# Patient Record
Sex: MALE | Race: WHITE | NOT HISPANIC OR LATINO | Employment: FULL TIME | RURAL
[De-identification: names, ages, dates, MRNs, and addresses within clinical notes are randomized per-mention and may not be internally consistent; named-entity substitution may affect disease eponyms.]

---

## 2024-10-29 ENCOUNTER — OFFICE VISIT (OUTPATIENT)
Dept: FAMILY MEDICINE CLINIC | Facility: CLINIC | Age: 34
End: 2024-10-29
Payer: COMMERCIAL

## 2024-10-29 VITALS
WEIGHT: 198 LBS | BODY MASS INDEX: 31.82 KG/M2 | TEMPERATURE: 99 F | DIASTOLIC BLOOD PRESSURE: 78 MMHG | HEIGHT: 66 IN | SYSTOLIC BLOOD PRESSURE: 118 MMHG

## 2024-10-29 DIAGNOSIS — F41.9 ANXIETY AND DEPRESSION: Primary | ICD-10-CM

## 2024-10-29 DIAGNOSIS — F32.A ANXIETY AND DEPRESSION: Primary | ICD-10-CM

## 2024-10-29 DIAGNOSIS — F17.290 VAPING NICOTINE DEPENDENCE, TOBACCO PRODUCT: ICD-10-CM

## 2024-10-29 DIAGNOSIS — M54.2 CERVICALGIA: ICD-10-CM

## 2024-10-29 DIAGNOSIS — A08.4 VIRAL ENTERITIS: ICD-10-CM

## 2024-10-29 DIAGNOSIS — E66.811 CLASS 1 OBESITY DUE TO EXCESS CALORIES WITHOUT SERIOUS COMORBIDITY WITH BODY MASS INDEX (BMI) OF 31.0 TO 31.9 IN ADULT: ICD-10-CM

## 2024-10-29 DIAGNOSIS — E66.09 CLASS 1 OBESITY DUE TO EXCESS CALORIES WITHOUT SERIOUS COMORBIDITY WITH BODY MASS INDEX (BMI) OF 31.0 TO 31.9 IN ADULT: ICD-10-CM

## 2024-10-29 PROBLEM — F41.1 GENERALIZED ANXIETY DISORDER: Status: ACTIVE | Noted: 2024-10-29

## 2024-10-29 PROCEDURE — 99204 OFFICE O/P NEW MOD 45 MIN: CPT | Performed by: INTERNAL MEDICINE

## 2024-10-29 RX ORDER — CYCLOBENZAPRINE HCL 5 MG
5 TABLET ORAL 3 TIMES DAILY PRN
Qty: 15 TABLET | Refills: 0 | Status: SHIPPED | OUTPATIENT
Start: 2024-10-29

## 2024-10-29 RX ORDER — PROMETHAZINE HYDROCHLORIDE 25 MG/1
25 TABLET ORAL EVERY 6 HOURS PRN
COMMUNITY
Start: 2024-10-28

## 2024-10-29 RX ORDER — PREDNISONE 10 MG/1
30 TABLET ORAL DAILY
Qty: 15 TABLET | Refills: 0 | Status: SHIPPED | OUTPATIENT
Start: 2024-10-29 | End: 2024-11-03

## 2024-10-29 NOTE — ASSESSMENT & PLAN NOTE
Patient with longstanding difficulties with his mood, last seen by Dr. Mukul Melissa 6/1/2021 where he discussed pattern of longstanding ADHD with some comorbid anxiety, depressive symptoms and possible mild bipolar disorder.  He had last been on regimen of Zoloft, Abilify at nighttime, and does feel it more his anxiety greater than depressive symptoms are flared up recently be interested in trying a medicine but is somewhat cautious not to do too much.  In context of comorbid bipolar disorder, I feel Vraylar would be a potentially good option and he is agreeable to initiating Vraylar 1.5 mg daily which could be titrated up at follow-up.  Continue lifestyle modifications to benefit mood.  no SI/HI.  Discussed with patient he could be potentially good candidate for GeneSight testing in the future if he had tolerability issues of medicine.  Advise concerns.

## 2024-10-29 NOTE — PROGRESS NOTES
"    Office Note     Name: Dakota Washington    : 1990     MRN: [id number]     Chief Complaint  er follow up    Subjective     History of Present Illness:  Dakota Washington is a 34 y.o. male who presents today for acute visit follow-up regarding multimedical problems.  Regular patient of Dr. Mukul Melissa but not seen in over 3 years, last on 2021.  Comes in having been seen at [facility] ER 10/27/2024 with main concern of some nausea and also left trapezius pain.  Regarding the nausea onset the day or so prior to that which has been a few days ago with some nauseousness, never vomiting but sometimes feeling close, but some looser bowel movements as well.  Never fever or chills.  That is doing a little bit better the last day but nausea still persisting but benefited by his Zofran and Phenergan.  We did discuss the potential that there could be some exacerbations of nauseousness with his anxiety pattern which is chronic but he overall feels that he does not associate the anxiety 2 episodes of nausea since.  Otherwise he also has had over the last week and a half or so some upper neck, more left trapezius level pain, possibly thought to be related to when he was hiking with his son and holding on his shoulders for a long walk, although not clearly triggering the next day.  We also discussed exacerbation potentially related to increased stressors over the last few weeks or month, although he does not specifically remember \"holding his chest and his neck region\".  No shooting pain down the arms or legs.  No strength issues, somewhat waxing waning but doing a little bit better the last couple days.  He when he was seen in the ER 10/27/2020 for he did a negative EKG, and CBC, CMP and troponin were all negative his he was also describing a little bit of left upper chest pain at that time which seems to be have been very muscular, that is actually doing better.  Related to mood, longstanding difficulties with " "mood including some anxiety, depression, possible bipolar disorder and ADHD historically.  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  No SI/HI.    Review of Systems    Objective     History reviewed. No pertinent past medical history.  History reviewed. No pertinent surgical history.  History reviewed. No pertinent family history.    Vital Signs  /78   Temp 99 °F (37.2 °C) (Temporal)   Ht 167.6 cm (66\")   Wt 89.8 kg (198 lb)   BMI 31.96 kg/m²   Estimated body mass index is 31.96 kg/m² as calculated from the following:    Height as of this encounter: 167.6 cm (66\").    Weight as of this encounter: 89.8 kg (198 lb).    Physical Exam  Constitutional:       General: He is not in acute distress.     Appearance: Normal appearance. He is not ill-appearing, toxic-appearing or diaphoretic.   HENT:      Right Ear: Tympanic membrane, ear canal and external ear normal.      Left Ear: Tympanic membrane, ear canal and external ear normal.      Nose: Nose normal. No rhinorrhea.      Mouth/Throat:      Mouth: Mucous membranes are moist.      Pharynx: Oropharynx is clear. No oropharyngeal exudate or posterior oropharyngeal erythema.   Neck:      Vascular: No carotid bruit.   Cardiovascular:      Rate and Rhythm: Normal rate and regular rhythm.      Pulses: Normal pulses.      Heart sounds: Normal heart sounds. No murmur heard.     No friction rub. No gallop.   Pulmonary:      Effort: Pulmonary effort is normal. No respiratory distress.      Breath sounds: Normal breath sounds. No stridor. No wheezing.   Abdominal:      General: Abdomen is flat. Bowel sounds are normal. There is no distension.    "   Palpations: Abdomen is soft. There is no mass.      Tenderness: There is abdominal tenderness. There is no guarding or rebound.      Comments: Tenderness in the upper abdomen, with negative rebound and guarding.   Musculoskeletal:         General: Tenderness present.      Cervical back: Neck supple.      Right lower leg: No edema.      Left lower leg: No edema.      Comments: Mild tenderness to palpate in the muscles of the upper neck is inserted to the posterior occiput, and the trapezius on the left greater than right, flexion of these muscles reproduces mild discomfort.  No swelling, no erythema.  No unusual lumps or bumps.   Lymphadenopathy:      Cervical: No cervical adenopathy.   Skin:     General: Skin is warm and dry.   Neurological:      General: No focal deficit present.      Mental Status: He is alert and oriented to person, place, and time. Mental status is at baseline.   Psychiatric:         Mood and Affect: Mood normal.         Behavior: Behavior normal.         Thought Content: Thought content normal.                   POCT Results (if applicable):  No results found for this or any previous visit.         Assessment and Plan     Diagnoses and all orders for this visit:    1. Anxiety and depression (Primary)  Assessment & Plan:  Patient with longstanding difficulties with his mood, last seen by Dr. Mukul Melissa 6/1/2021 where he discussed pattern of longstanding ADHD with some comorbid anxiety, depressive symptoms and possible mild bipolar disorder.  He had last been on regimen of Zoloft, Abilify at nighttime, and does feel it more his anxiety greater than depressive symptoms are flared up recently be interested in trying a medicine but is somewhat cautious not to do too much.  In context of comorbid bipolar disorder, I feel Vraylar would be a potentially good option and he is agreeable to initiating Vraylar 1.5 mg daily which could be titrated up at follow-up.  Continue lifestyle modifications to  benefit mood.  no SI/HI.  Discussed with patient he could be potentially good candidate for GeneSight testing in the future if he had tolerability issues of medicine.  Advise concerns.    Orders:  -     Cariprazine HCl (Vraylar) 1.5 MG capsule capsule; Take 1 capsule by mouth Daily.  Dispense: 30 capsule; Refill: 1    2. Cervicalgia  Assessment & Plan:  Flare over the last couple weeks, felt possibly related to an episode where he went hiking, maybe corrugation just partly related to increased stressors and feeling them in the neck and upper torso region.  No concerning neurologic manifestations.  Initiate prednisone 10 mg tablet 3 tablets daily x 5 days followed by naproxen 375 mg twice daily for another week or 10 days.  Heating pad, light stretching.  Addition provided Flexeril 5 mg 3 times daily as needed, #15, use mostly at nighttime, caution sedation.  Advised if not improving.    Orders:  -     predniSONE (DELTASONE) 10 MG tablet; Take 3 tablets by mouth Daily for 5 days.  Dispense: 15 tablet; Refill: 0  -     naproxen (NAPROSYN) 375 MG tablet; Take 1 tablet by mouth 2 (Two) Times a Day As Needed for Mild Pain for up to 30 days.  Dispense: 60 tablet; Refill: 0  -     cyclobenzaprine (FLEXERIL) 5 MG tablet; Take 1 tablet by mouth 3 (Three) Times a Day As Needed for Muscle Spasms.  Dispense: 15 tablet; Refill: 0    3. Viral enteritis  Assessment & Plan:  Pattern of some ongoing nausea over the last 3 to 4 days, felt to be most consistent with a milder stomach bug with some little bit of looser bowel movements, overall good hydration.  He already has Zofran and Phenergan to use as needed, although recommend Omnicef same time.  Push fluids, with gradual transition back diet as tolerated, although he does what sounds like periodic fasting episodes for attempts at weight loss.  Advise if not improving.      4. Vaping nicotine dependence, tobacco product  Assessment & Plan:  Previous smoker at about 1 pack/day on  and off for about a total of 10 years, with cessation of September 2020 but initiation of vaping since.  Recommend benefits of vaping cessation as it has its own risks, he will consider but is not ready to pursue at this time.      5. Class 1 obesity due to excess calories without serious comorbidity with body mass index (BMI) of 31.0 to 31.9 in adult  Assessment & Plan:  Somewhat longstanding challenges with his weight, he is making efforts to eat healthier, although is also implementing a fasting schedule which he feels has been beneficial to him, doing worse over the last few weeks.  As long as he ensures he has adequate caloric intake, this can be a reasonable approach.        BMI is >= 30 and <35. (Class 1 Obesity). The following options were offered after discussion;: weight loss educational material (shared in after visit summary), exercise counseling/recommendations, and nutrition counseling/recommendations        Vaccine Counseling:    Smoking Cessation:   3-10 mintues spent counseling Will try to cut down    Follow Up  Return in about 6 weeks (around 12/10/2024) for Annual physical.    Edson Melissa MD

## 2024-10-29 NOTE — ASSESSMENT & PLAN NOTE
Pattern of some ongoing nausea over the last 3 to 4 days, felt to be most consistent with a milder stomach bug with some little bit of looser bowel movements, overall good hydration.  He already has Zofran and Phenergan to use as needed, although recommend Omnicef same time.  Push fluids, with gradual transition back diet as tolerated, although he does what sounds like periodic fasting episodes for attempts at weight loss.  Advise if not improving.

## 2024-10-29 NOTE — ASSESSMENT & PLAN NOTE
Previous smoker at about 1 pack/day on and off for about a total of 10 years, with cessation of September 2020 but initiation of vaping since.  Recommend benefits of vaping cessation as it has its own risks, he will consider but is not ready to pursue at this time.

## 2024-10-29 NOTE — ASSESSMENT & PLAN NOTE
Flare over the last couple weeks, felt possibly related to an episode where he went hiking, maybe corrugation just partly related to increased stressors and feeling them in the neck and upper torso region.  No concerning neurologic manifestations.  Initiate prednisone 10 mg tablet 3 tablets daily x 5 days followed by naproxen 375 mg twice daily for another week or 10 days.  Heating pad, light stretching.  Addition provided Flexeril 5 mg 3 times daily as needed, #15, use mostly at nighttime, caution sedation.  Advised if not improving.

## 2024-10-29 NOTE — ASSESSMENT & PLAN NOTE
Somewhat longstanding challenges with his weight, he is making efforts to eat healthier, although is also implementing a fasting schedule which he feels has been beneficial to him, doing worse over the last few weeks.  As long as he ensures he has adequate caloric intake, this can be a reasonable approach.

## 2024-11-25 DIAGNOSIS — M54.2 CERVICALGIA: ICD-10-CM

## 2024-11-25 NOTE — TELEPHONE ENCOUNTER
Denied naproxen it was given for 30 days if patient requires more will need to be seen by primary

## 2025-02-14 ENCOUNTER — OFFICE VISIT (OUTPATIENT)
Dept: FAMILY MEDICINE CLINIC | Facility: CLINIC | Age: 35
End: 2025-02-14
Payer: COMMERCIAL

## 2025-02-14 VITALS
DIASTOLIC BLOOD PRESSURE: 78 MMHG | BODY MASS INDEX: 28.9 KG/M2 | HEART RATE: 84 BPM | OXYGEN SATURATION: 98 % | SYSTOLIC BLOOD PRESSURE: 122 MMHG | WEIGHT: 179.8 LBS | TEMPERATURE: 98.2 F | HEIGHT: 66 IN

## 2025-02-14 DIAGNOSIS — H65.03 BILATERAL ACUTE SEROUS OTITIS MEDIA, RECURRENCE NOT SPECIFIED: ICD-10-CM

## 2025-02-14 DIAGNOSIS — Z87.891 HISTORY OF NICOTINE VAPING: ICD-10-CM

## 2025-02-14 DIAGNOSIS — F41.9 ANXIETY: ICD-10-CM

## 2025-02-14 DIAGNOSIS — F32.A DEPRESSION, UNSPECIFIED DEPRESSION TYPE: ICD-10-CM

## 2025-02-14 DIAGNOSIS — Z87.891 EX-CIGARETTE SMOKER: ICD-10-CM

## 2025-02-14 DIAGNOSIS — H69.93 DYSFUNCTION OF BOTH EUSTACHIAN TUBES: ICD-10-CM

## 2025-02-14 DIAGNOSIS — J06.9 UPPER RESPIRATORY TRACT INFECTION, UNSPECIFIED TYPE: Primary | ICD-10-CM

## 2025-02-14 PROCEDURE — 99214 OFFICE O/P EST MOD 30 MIN: CPT | Performed by: INTERNAL MEDICINE

## 2025-02-14 RX ORDER — FLUTICASONE PROPIONATE 50 MCG
2 SPRAY, SUSPENSION (ML) NASAL DAILY
Qty: 16 G | Refills: 1 | Status: SHIPPED | OUTPATIENT
Start: 2025-02-14

## 2025-02-14 RX ORDER — PREDNISONE 20 MG/1
20 TABLET ORAL DAILY
Qty: 5 TABLET | Refills: 0 | Status: SHIPPED | OUTPATIENT
Start: 2025-02-14 | End: 2025-02-19

## 2025-02-14 NOTE — ASSESSMENT & PLAN NOTE
Bilateral serous effusions noted undoubtedly secondary to eustachian tube dysfunction caused by URI.  Treating with prednisone along with Flonase to help facilitate resolution of the obstruction thus plugging sensation in his ears.

## 2025-02-14 NOTE — ASSESSMENT & PLAN NOTE
Secondary to bilateral eustachian tube dysfunction from underlying URI.  Treat with prednisone along with Flonase nasal spray as needed.  Advise if symptoms not resolving.

## 2025-02-14 NOTE — PROGRESS NOTES
Follow Up Office Visit      Date: 2025   Patient Name: Dakota Washington  : 1990   MRN: [id number]     Chief Complaint:    Chief Complaint   Patient presents with    Earache       History of Present Illness: Dakota Washington is a 34 y.o. male who is here today for 2 days ago also minimal nasal congestion postnasal drip and a plugging sensation in his ears, more so on the left, noting symptoms maybe a little bit better.  No true pain, hearing overall seems to be unremarkable..  Not ill, no fevers chills or sweats.  He also has a history of longstanding anxiety and depression with questionable bipolar disorder, in the past having been treated with Zoloft and Abilify.  Saw Dr. Edson Melissa in 10/2024 where he is prescribed a trial of Vraylar which he indicates he did not actually take.  He has tried to make some lifestyle modifications including exercise and dieting as a means of generally improving his overall health.  He has lost some weight on this regimen though he does note some potential mood concerns, for which we will address that I recommended upcoming complete physical.  Ex-cigarette smoker abstaining for 10 years and most recently stopped vaping a couple days ago after vaping for 3 to 4 years    Subjective      Review of Systems:   Review of Systems    I have reviewed the patients family history, social history, past medical history, past surgical history and have updated it as appropriate.     Medications:     Current Outpatient Medications:     fluticasone (FLONASE) 50 MCG/ACT nasal spray, Administer 2 sprays into the nostril(s) as directed by provider Daily., Disp: 16 g, Rfl: 1    predniSONE (DELTASONE) 20 MG tablet, Take 1 tablet by mouth Daily for 5 days., Disp: 5 tablet, Rfl: 0    Allergies:   Allergies   Allergen Reactions    Penicillins Unknown - Low Severity       Objective     Physical Exam: Please see above  Vital Signs:   Vitals:    25 1302   BP: 122/78   BP Location: Left arm   Patient  "Position: Sitting   Cuff Size: Adult   Pulse: 84   Temp: 98.2 °F (36.8 °C)   TempSrc: Temporal   SpO2: 98%   Weight: 81.6 kg (179 lb 12.8 oz)   Height: 167.6 cm (66\")     Body mass index is 29.02 kg/m².          Physical Exam  Constitutional:       General: He is not in acute distress.     Appearance: Normal appearance. He is not ill-appearing.      Comments: Pleasant healthy well-spoken well-groomed 34-year-old male, NAD, BMI 29.0 reflecting a 19 pound weight loss by intent in the last 3 and half months.   HENT:      Right Ear: Ear canal normal.      Left Ear: Ear canal normal.      Ears:      Comments: TMs bilaterally with clear effusions, slight dullness     Nose: Congestion present. No rhinorrhea.      Mouth/Throat:      Mouth: Mucous membranes are moist.      Pharynx: Oropharyngeal exudate and posterior oropharyngeal erythema present.      Comments: Minimal nonspecific posterior erythema  Cardiovascular:      Rate and Rhythm: Normal rate and regular rhythm.      Heart sounds: Normal heart sounds. No murmur heard.     No friction rub. No gallop.   Pulmonary:      Effort: Pulmonary effort is normal. No respiratory distress.      Breath sounds: Normal breath sounds.   Musculoskeletal:      Cervical back: No rigidity or tenderness.   Lymphadenopathy:      Cervical: No cervical adenopathy.   Neurological:      General: No focal deficit present.      Mental Status: He is alert.   Psychiatric:         Mood and Affect: Mood normal.         Procedures    Results:   Labs:   No results found for: \"HGBA1C\", \"CMP\", \"CBCDIFFPANEL\", \"CREAT\", \"TSH\"     POCT Results (if applicable):   No results found for this or any previous visit.      Assessment / Plan      Assessment/Plan:   Diagnoses and all orders for this visit:    1. Upper respiratory tract infection, unspecified type (Primary)  Assessment & Plan:  Mild URI symptoms for which we will treat with Flonase along with OTC cough and cold medications.  This is likely causing " secondary eustachian tube dysfunction with resultant bilateral serous effusions.      2. Dysfunction of both eustachian tubes  Assessment & Plan:  Bilateral serous effusions noted undoubtedly secondary to eustachian tube dysfunction caused by URI.  Treating with prednisone along with Flonase to help facilitate resolution of the obstruction thus plugging sensation in his ears.    Orders:  -     predniSONE (DELTASONE) 20 MG tablet; Take 1 tablet by mouth Daily for 5 days.  Dispense: 5 tablet; Refill: 0  -     fluticasone (FLONASE) 50 MCG/ACT nasal spray; Administer 2 sprays into the nostril(s) as directed by provider Daily.  Dispense: 16 g; Refill: 1    3. Bilateral acute serous otitis media, recurrence not specified  Assessment & Plan:  Secondary to bilateral eustachian tube dysfunction from underlying URI.  Treat with prednisone along with Flonase nasal spray as needed.  Advise if symptoms not resolving.    Orders:  -     predniSONE (DELTASONE) 20 MG tablet; Take 1 tablet by mouth Daily for 5 days.  Dispense: 5 tablet; Refill: 0  -     fluticasone (FLONASE) 50 MCG/ACT nasal spray; Administer 2 sprays into the nostril(s) as directed by provider Daily.  Dispense: 16 g; Refill: 1    4. Anxiety  Assessment & Plan:  Patient has had a history of longstanding depression with concomitant anxiety, previously having been treated with most recently with Abilify and Zoloft.  He had seen Dr. Edson Melissa in 10/2024 where he was prescribed a trial of Vraylar as monotherapy.  He indicates to me he really did not even take the medication, and has tried to make some lifestyle modifications as this treatment modality.  Will contemplate in the future potentially trying some other medication.  Will discuss in the future at recommended complete physical.      5. Depression, unspecified depression type  Assessment & Plan:  Patient has had a history of longstanding depression with concomitant anxiety, previously having been treated with most  recently with Abilify and Zoloft.  He had seen Dr. Edson Melissa in 10/2024 where he was prescribed a trial of Vraylar as monotherapy.  He indicates to me he really did not even take the medication, and has tried to make some lifestyle modifications as this treatment modality.  Will contemplate in the future potentially trying some other medication.  Will discuss in the future at recommended complete physical.      6. Ex-cigarette smoker  Assessment & Plan:  Relates abstaining from cigarette smoking for the past 10 years, at approximately age 24.      7. History of nicotine vaping  Assessment & Plan:  Relates stopping vaping nicotine product 2 days ago with intent to maintain abstinence, having utilized a vaping pen for the last 3 to 4 years.  Encouraged ongoing abstinence.  If he does have trouble with his nicotine craving, I did then suggest use of NicoDerm patches, Nicorette gum or nicotine oral pouches as needed.          Follow Up:   Return in about 2 months (around 4/14/2025) for Annual physical.      At [facility], we believe that sharing information builds trust and better relationships. You are receiving this note because you recently visited [facility]. It is possible you will see health information before a provider has talked with you about it. This kind of information can be easy to misunderstand. To help you fully understand what it means for your health, we urge you to discuss this note with your provider.    Mukul Melissa MD  [facility]

## 2025-02-14 NOTE — ASSESSMENT & PLAN NOTE
Patient has had a history of longstanding depression with concomitant anxiety, previously having been treated with most recently with Abilify and Zoloft.  He had seen Dr. Edson Melissa in 10/2024 where he was prescribed a trial of Vraylar as monotherapy.  He indicates to me he really did not even take the medication, and has tried to make some lifestyle modifications as this treatment modality.  Will contemplate in the future potentially trying some other medication.  Will discuss in the future at recommended complete physical.

## 2025-02-14 NOTE — ASSESSMENT & PLAN NOTE
Mild URI symptoms for which we will treat with Flonase along with OTC cough and cold medications.  This is likely causing secondary eustachian tube dysfunction with resultant bilateral serous effusions.

## 2025-02-14 NOTE — ASSESSMENT & PLAN NOTE
Relates stopping vaping nicotine product 2 days ago with intent to maintain abstinence, having utilized a vaping pen for the last 3 to 4 years.  Encouraged ongoing abstinence.  If he does have trouble with his nicotine craving, I did then suggest use of NicoDerm patches, Nicorette gum or nicotine oral pouches as needed.

## 2025-03-24 ENCOUNTER — TELEPHONE (OUTPATIENT)
Dept: FAMILY MEDICINE CLINIC | Facility: CLINIC | Age: 35
End: 2025-03-24
Payer: COMMERCIAL

## 2025-03-24 NOTE — TELEPHONE ENCOUNTER
I have spoke with him and have let him know that Dr. Gilman says that he can take both of the medication. He states he will see us ion at his physical. TF

## 2025-03-24 NOTE — TELEPHONE ENCOUNTER
Patient called our office asking to have his April appointment moved up. I have rescheduled his physical for tomorrow, March 25 with Dr. Gilman.     Patient did state his anxiety has been extremely bad the past few weeks. He did tell me he started taking Vraylar this morning, but wanted to know if he could take a prescribed Ativan with Vraylar until he can be seen at his appointment.     Vraylar was prescribed my us?, and Ativan from the ER.    I let patient know his appointment was moved up, but I would have the nurse contact him regarding medication.    Please contact patient at [phone]

## 2025-03-25 ENCOUNTER — OFFICE VISIT (OUTPATIENT)
Age: 35
End: 2025-03-25
Payer: COMMERCIAL

## 2025-03-25 ENCOUNTER — OFFICE VISIT (OUTPATIENT)
Dept: FAMILY MEDICINE CLINIC | Facility: CLINIC | Age: 35
End: 2025-03-25
Payer: COMMERCIAL

## 2025-03-25 VITALS
BODY MASS INDEX: 27.71 KG/M2 | DIASTOLIC BLOOD PRESSURE: 84 MMHG | HEIGHT: 66 IN | HEART RATE: 133 BPM | OXYGEN SATURATION: 97 % | WEIGHT: 172.4 LBS | TEMPERATURE: 97.9 F | SYSTOLIC BLOOD PRESSURE: 136 MMHG

## 2025-03-25 VITALS
HEART RATE: 78 BPM | DIASTOLIC BLOOD PRESSURE: 84 MMHG | WEIGHT: 172.4 LBS | OXYGEN SATURATION: 98 % | HEIGHT: 66 IN | BODY MASS INDEX: 27.71 KG/M2 | SYSTOLIC BLOOD PRESSURE: 134 MMHG

## 2025-03-25 DIAGNOSIS — Z11.59 NEED FOR HEPATITIS C SCREENING TEST: ICD-10-CM

## 2025-03-25 DIAGNOSIS — F31.62 BIPOLAR DISORDER, CURRENT EPISODE MIXED, MODERATE: ICD-10-CM

## 2025-03-25 DIAGNOSIS — F33.1 MODERATE EPISODE OF RECURRENT MAJOR DEPRESSIVE DISORDER: ICD-10-CM

## 2025-03-25 DIAGNOSIS — F41.1 GENERALIZED ANXIETY DISORDER WITH PANIC ATTACKS: ICD-10-CM

## 2025-03-25 DIAGNOSIS — Z13.1 SCREENING FOR DIABETES MELLITUS: ICD-10-CM

## 2025-03-25 DIAGNOSIS — F41.0 GENERALIZED ANXIETY DISORDER WITH PANIC ATTACKS: ICD-10-CM

## 2025-03-25 DIAGNOSIS — Z00.01 ENCOUNTER FOR GENERAL ADULT MEDICAL EXAMINATION WITH ABNORMAL FINDINGS: Primary | ICD-10-CM

## 2025-03-25 DIAGNOSIS — Z87.891 HISTORY OF NICOTINE VAPING: ICD-10-CM

## 2025-03-25 DIAGNOSIS — F31.62 BIPOLAR DISORDER, CURRENT EPISODE MIXED, MODERATE: Primary | ICD-10-CM

## 2025-03-25 DIAGNOSIS — F41.9 ANXIETY: ICD-10-CM

## 2025-03-25 DIAGNOSIS — E78.5 DYSLIPIDEMIA: ICD-10-CM

## 2025-03-25 DIAGNOSIS — E55.9 VITAMIN D DEFICIENCY: ICD-10-CM

## 2025-03-25 DIAGNOSIS — Z28.82 PARENT REFUSES IMMUNIZATIONS: ICD-10-CM

## 2025-03-25 DIAGNOSIS — Z13.29 SCREENING FOR THYROID DISORDER: ICD-10-CM

## 2025-03-25 DIAGNOSIS — E66.3 OVERWEIGHT (BMI 25.0-29.9): ICD-10-CM

## 2025-03-25 DIAGNOSIS — Z87.891 EX-CIGARETTE SMOKER: ICD-10-CM

## 2025-03-25 RX ORDER — LAMOTRIGINE 25 MG/1
TABLET ORAL
Qty: 46 TABLET | Refills: 0 | Status: SHIPPED | OUTPATIENT
Start: 2025-03-25 | End: 2025-04-24

## 2025-03-25 NOTE — ASSESSMENT & PLAN NOTE
Patient declines recommended Tdap, COVID-19 and influenza vaccines today despite counseling regarding safety and efficacy

## 2025-03-25 NOTE — PROGRESS NOTES
..  Venipuncture Blood Specimen Collection  Venipuncture performed in right arm by Loulou Palma with good hemostasis. Patient tolerated the procedure well without complications.   03/25/25   Loulou Palma

## 2025-03-25 NOTE — ASSESSMENT & PLAN NOTE
>>ASSESSMENT AND PLAN FOR ANXIETY AND DEPRESSION WRITTEN ON 3/25/2025  1:27 PM BY JOANIE SPANGLER APRN     >>ASSESSMENT AND PLAN FOR DEPRESSION WRITTEN ON 3/25/2025 12:06 PM BY GEENA THOMAS MD    Patient has had longstanding anxiety and depression with irritability and anger consistent with bipolar disorder with mixed features.  Previously had been prescribed sertraline past which she did not take, as well as briefly took Lexapro, and most recently had been prescribed Vraylar 1.5 mg nightly which she just took 1 dose yesterday.  His symptoms are becoming disabling regarding his quality of life.  Previously smoked marijuana but not for some time now.  Denies any history of illicit drug use or alcohol abuse otherwise.  No SI/HI.  Referring to psychiatry as well as for counseling, initial appointment today, deferring targeted medication regimen to that consultant.     >>ASSESSMENT AND PLAN FOR ANXIETY WRITTEN ON 3/25/2025 12:06 PM BY GEENA THOMAS MD    Patient has had longstanding anxiety and depression with irritability and anger consistent with bipolar disorder with mixed features.  Previously had been prescribed sertraline past which she did not take, as well as briefly took Lexapro, and most recently had been prescribed Vraylar 1.5 mg nightly which she just took 1 dose yesterday.  His symptoms are becoming disabling regarding his quality of life.  Previously smoked marijuana but not for some time now.  Denies any history of illicit drug use or alcohol abuse otherwise.  No SI/HI.  Referring to psychiatry as well as for counseling, initial appointment today, deferring targeted medication regimen to that consultant.

## 2025-03-25 NOTE — ASSESSMENT & PLAN NOTE
Patient had been vaping nicotine products since smoke abstinence several years ago.  He discontinued completely approximately 3 months ago

## 2025-03-25 NOTE — ASSESSMENT & PLAN NOTE
Patient has had longstanding anxiety and depression with irritability and anger consistent with bipolar disorder with mixed features.  Previously had been prescribed sertraline past which she did not take, as well as briefly took Lexapro, and most recently had been prescribed Vraylar 1.5 mg nightly which she just took 1 dose yesterday.  His symptoms are becoming disabling regarding his quality of life.  Previously smoked marijuana but not for some time now.  Denies any history of illicit drug use or alcohol abuse otherwise.  No SI/HI.  Referring to psychiatry as well as for counseling, initial appointment today, deferring targeted medication regimen to that consultant.

## 2025-03-25 NOTE — ASSESSMENT & PLAN NOTE
Patient previously took fleeting supplement vitamin D, but no longer.  Update level and make further recommendation regarding need for supplementation.

## 2025-03-25 NOTE — ASSESSMENT & PLAN NOTE
Patient encouraged to continue his recent efforts at healthy lifestyle changes with diet and regular physical activity

## 2025-03-25 NOTE — ASSESSMENT & PLAN NOTE
Laboratory testing through [facility] on 12/21/2024 revealing a mildly elevated LDL of 142 with remainder of lipid profile satisfactory.  We discussed need to continue efforts at lifestyle change with diet and exercise, planning to repeat another lipid profile in 1 year

## 2025-03-25 NOTE — ASSESSMENT & PLAN NOTE
34-year-old male presenting for complete physical with specific health issues being addressed as detailed below, health maintenance includes recent laboratory testing through the [facility] including CBC, CMP, troponin x 2, and EKG x 2 all normal.  Lipid profile mildly elevated with LDL of 142.  Plan supplement with TSH, vitamin D, hepatitis C and UA screens.  Patient being referred to psychiatry as well as for counseling to address his mental health issues.  Follow-up here in 1 year for another complete physical, and as needed in the interim.

## 2025-03-25 NOTE — PROGRESS NOTES
New Patient Office Visit      Patient Name: Dakota Washington  : 1990   MRN: [id number]     Referring Provider: Mukul Melissa MD    Chief Complaint:  Psychiatric evaluation related to:     ICD-10-CM ICD-9-CM   1. Bipolar disorder, current episode mixed, moderate  F31.62 296.62   2. Generalized anxiety disorder with panic attacks  F41.1 300.02    F41.0 300.01        History of Present Illness:   Dakota Washington is a 34 y.o. male who is here today for initial psychiatric evaluation. He presents with complaints of depression and anxiety. He says he has become so irritable recently that it is not fair to his wife and children. He has dealt with these problems for most of his life, but has been resistant to medication in the past. He had a couple trials of antidepressants, but stopped taking them after a couple weeks before they had a chance to be effective. He was prescribed Vraylar this past October and did not begin taking it until recently. He does not like this medication as he says it caused him GI side effects, as well as sedation. He says much of his anxiety recently has been caused by his stressful job,as well as health-related anxiety. He has been seen by his PCP who cleared him of any health-related illness. His primary symptoms include irritability, racing thoughts, lack of focus, and occasional panic attacks. His most recent panic attack was in December which resulted in an emergency room visit. He was prescribed Ativan 1 mg as needed during this visit and reports he has been taking it only sparingly. During the interview he presented with significantly increased rate of speech. He also shared that he has been engaging in a lot of conspiracy theories on line. I screened him for bipolar on the mood disorder questionnaire and he was positive with a score of 10. He says during the past 2 weeks he has been more depressed than usual, and is tired of feeling this way, and is ready to deal with this issue now.  "He currently is not utilizing any drugs, but in the past he has self-medicated with marijuana, tobacco, and party drugs when he was younger. He reports he is still sleeping well and has not struggled with that, other than occasionally having trouble falling asleep due to racing thoughts. He denies suicidal ideation or a history of suicidal ideation. He reports no history of trauma. He said his parents  at the age of 7, but maintained a good relationship with both of them. He describes his childhood as a \"standard childhood,\" however his mother has been hospitalized multiple times for bipolar. Of note, he was on stimulants as a child and as a young adult for ADHD. He says the stimulants made him a zombie as a child, and made him manic as an adult.      Subjective     PHQ-9 Depression Screening Score: PHQ-9 Total Score: 19  ANDREAS-7 Anxiety Screening Score: 20  Mood Disorder Questionnaire Screening Score: 10    Psychiatric Review of Systems:   Mood: Depressed  Anxiety: Moderate anxiety  Ada: Positive for periods of increased hyper behavior and confidence, irritability, increased rate of speech, racing thoughts, being easily distracted, periods of increased energy, periods of increased activity and social behavior, and engaging in risky behavior.  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    Interpersonal/Relational:  Marital Status:   Family Structure: Lives with wife who he has been with for 13 years, and their 2 sons, age 11 and 4. He also has another 11-year-old son with another woman whom he has split custody with. He says he argues a lot with his wife, but they also have good days as well. Positive relationship with both parents.  Support system:  Family      Psychiatric History:   Medication: Currently taking Ativan 1 mg as needed.  Recently prescribed Vraylar, which he has not been taking. " Previous trials of SSRI's, which he stopped taking before they had a chance to be effective.  Hospitalization: Denies  Counseling/Therapy: Will be starting therapy later this week  Seizures: Denies  Suicide Attempts: Denies  Suicidal Ideation: Denies  Self-injurious behavior: Denies    History of Substance Use/Abuse:   Alcohol: Currently only drinks socially. Drank more heavily in the past, which at one point resulted in a DUI and the loss of his job.  Drugs: Currently denies. Used marijuana heavily in the past for 10 years, but has stopped due to it causing increased anxiety. Used illegal drugs when younger at parties.  Caffeine: Currently denies. Consumed moderately before.  Tobacco: Former smoker in his 20s. Has since quit. Also recently quit vaping.     Family Psychiatric History:  Mother positive for bipolar, which has resulted in multiple hospitalizations for her.    Significant Life Events:   Has patient experienced any form of verbal, physical, emotional, or sexual abuse? no  Has patient experienced a death / loss of relationship? no  Has patient experienced a major accident or tragic events? no    Social History:   Social History     Socioeconomic History    Marital status:    Tobacco Use    Smoking status: Never    Smokeless tobacco: Never   Vaping Use    Vaping status: Former   Substance and Sexual Activity    Alcohol use: Yes     Comment: moderately    Drug use: Never        Past Medical History: History reviewed. No pertinent past medical history.    Past Surgical History: History reviewed. No pertinent surgical history.    Family History: History reviewed. No pertinent family history.    Medications:     Current Outpatient Medications:     Cariprazine HCl (Vraylar) 1.5 MG capsule capsule, Take 1 capsule by mouth Daily., Disp: , Rfl:     fluticasone (FLONASE) 50 MCG/ACT nasal spray, Administer 2 sprays into the nostril(s) as directed by provider Daily., Disp: 16 g, Rfl: 1    lamoTRIgine  "(LaMICtal) 25 MG tablet, Take 1 tablet by mouth Daily for 14 days, THEN 2 tablets Daily for 16 days., Disp: 46 tablet, Rfl: 0    Allergies:   Allergies   Allergen Reactions    Penicillins Unknown - Low Severity         Objective     Physical Exam:  Vital Signs:   Vitals:    03/25/25 1106   BP: 134/84   Pulse: 78   SpO2: 98%   Weight: 78.2 kg (172 lb 6.4 oz)   Height: 167.6 cm (66\")     Body mass index is 27.83 kg/m².     Mental Status Exam:   Hygiene:   good  Cooperation:  Cooperative  Eye Contact:  Good  Psychomotor Behavior:  Appropriate  Affect:  Appropriate  Mood: anxious  Speech:  Rapid  Thought Process:  Goal directed and Linear  Thought Content:  Normal  Suicidal:  None  Homicidal:  None  Hallucinations:  None  Delusion:  None  Memory:  Intact  Orientation:  Grossly intact  Reliability:  good  Insight:  Good  Judgement:  Good  Impulse Control:  Good  Physical/Medical Issues:  No      SUICIDE RISK ASSESSMENT/CSSRS:  1. Does patient have thoughts of suicide? no  2. Does patient have intent for suicide? no  3. Does patient have a current plan for suicide? no  4. History of suicide attempts: no  5. Family history of suicide or attempts: no  6. History of violent behaviors towards others or property or thoughts of committing suicide: no  7. History of sexual aggression toward others: no  8. Access to firearms or weapons: no    Assessment / Plan      Visit Diagnosis/Orders Placed This Visit:  Diagnoses and all orders for this visit:    1. Bipolar disorder, current episode mixed, moderate (Primary)  -     lamoTRIgine (LaMICtal) 25 MG tablet; Take 1 tablet by mouth Daily for 14 days, THEN 2 tablets Daily for 16 days.  Dispense: 46 tablet; Refill: 0    2. Generalized anxiety disorder with panic attacks           PLAN:  Safety: No acute safety concerns  Risk Assessment: Risk of self-harm acutely is low. Risk of self-harm chronically is also low, but could be further elevated in the event of treatment " noncompliance.    Treatment Plan/Goals: Given the patient's symptoms and his family history I believe he suffers from bipolar disorder. I will start him on the lamotrigine titration for mood stabilization. I explained to him to watch for the rash and to stop taking the medication if he develops the rash. I told him he can hold off on the Vraylar for now since he does not like this medication. Hopefully lamotrigine monotherapy will be enough to stabilize his mood. If not we can look into augmentation in the future. I told him I am okay with him continuing to take the Ativan 1 mg as needed sparingly, like he is doing, until he runs out. I explained to him the dangers of this medication and that we will not be continuing it after this initial trial. I explained to him that if he continues to suffer from anxiety after we stabilize his mood we can look into other anxiety medication in the future. I will follow up in 1 month to assess the medication plan.    Continue supportive psychotherapy efforts and medications as indicated. Treatment and medication options discussed during today's visit. Patient ackowledged and verbally consented to continue with current treatment plan and was educated on the importance of compliance with treatment and follow-up appointments. Patient seems reasonably able to adhere to treatment plan.      Provided a safe, confidential environment to facilitate the development of a positive therapeutic relationship and encouraged open, honest communication. Assisted Patient in identifying risk factors which would indicate the need for higher level of care including thoughts to harm self or others and/or self-harming behavior and encouraged patient to contact this office, call 911, or present to the nearest emergency room should any of these events occur. Discussed crisis intervention services and means to access.      Quality Measures:   Former smoker       Follow Up:   Return in about 4 weeks (around  4/22/2025).      JEYSON Castro

## 2025-03-25 NOTE — ASSESSMENT & PLAN NOTE
Cigarette smoker approximately 0.5-1 pack/day, abstaining now for at least 3 to 4 years per patient account, previously indicated he had been abstaining since 2015.  Continue abstinence.

## 2025-03-25 NOTE — PROGRESS NOTES
Male Physical Note      Date: 2025   Patient Name: Dakota Washington  : 1990   MRN: [id number]     Chief Complaint:    Chief Complaint   Patient presents with    Annual Exam       History of Present Illness: Dakota Washington is a 34 y.o. male who is here today for their annual health maintenance and physical.  Primary concern the patient has severe anxiety worrying about his health, constantly thinking something is going to go wrong, also being down in his moods, and having history of irritability and anger outbursts.  He briefly took medication in the past including Lexapro for couple weeks but never gave it a fair shake.  Saw Dr. Edson Melissa in my absence in 10/2024  having been prescribed Vraylar 1.5 mg to take nightly, but he did not do so up until last night.  He did go to the ER last on 2024 having symptoms of anxiety and panic attack with symptoms of anxiousness, rapid heart rate, chest discomfort and shortness of breath, having been given Ativan in the ER with a prescription for Ativan 1 mg tablets #20 to take as needed, but noting he never took the medication up until just in the last few days took several doses.  He has always been resistant to taking any medication but now recognizes that he needs to do so.  Denies SI/HI.  Denies any history of alcohol or illicit substance abuse other than in the past did smoke marijuana to try to help his nerves, subsequently discontinuing THC when this seemed to make his anxiety worse.  He was a cigarette smoker approximately 0.5-1 pack/day for about 10 years quitting several years ago subsequently vaping which he subsequently discontinued 2 or 3 months ago.  Has become much more diligent about trying to improve his fitness, much healthier diet, often doing fasting, starting to initiate walking and  running which actually does seem to make him feel better, but he will have aches and pains in his arms and legs as well as other various symptoms which seem to  stress him out, causing him to look on the Internet and worrying about the worst things that could be happening to his body.  Lab testing in his 12/2024 ER visit did indicate LDL slightly high at 142 otherwise lipid profile was unremarkable, CMP CBC troponin x 2 and EKG x 2 were all normal.  No family history of cancer.      Subjective      Review of Systems:   Review of Systems    Past Medical History, Social History, Family History and Care Team were all reviewed with patient and updated as appropriate.     Medications:     Current Outpatient Medications:     fluticasone (FLONASE) 50 MCG/ACT nasal spray, Administer 2 sprays into the nostril(s) as directed by provider Daily., Disp: 16 g, Rfl: 1    Cariprazine HCl (Vraylar) 1.5 MG capsule capsule, Take 1 capsule by mouth Daily., Disp: , Rfl:     lamoTRIgine (LaMICtal) 25 MG tablet, Take 1 tablet by mouth Daily for 14 days, THEN 2 tablets Daily for 16 days., Disp: 46 tablet, Rfl: 0    Allergies:   Allergies   Allergen Reactions    Penicillins Unknown - Low Severity       Immunizations:  Health Maintenance Summary            Current Care Gaps       TDAP/TD VACCINES (2 - Tdap) Overdue since 7/22/2013 07/22/2003  Imm Admin: Td (TDVAX)                      Awaiting Completion       HEPATITIS C SCREENING (Once) Order placed this encounter      03/25/2025  Order placed for Hepatitis C Antibody by Mukul Melissa MD                      Upcoming       COVID-19 Vaccine (1 - 2024-25 season) Postponed until 9/21/2025 03/25/2025  Postponed until 9/21/2025 by Loulou Palma (Patient Refused - not in stock)              INFLUENZA VACCINE (Yearly - July to March) Postponed until 9/21/2025 03/25/2025  Postponed until 9/21/2025 by Loulou Palma (Patient Refused)              ANNUAL PHYSICAL (Yearly) Next due on 3/25/2026      03/25/2025  Done                      Completed or No Longer Recommended       Pneumococcal Vaccine 0-49 (Series Information) Aged Out  "    No completion, postpone, or frequency change history exists for this topic.                            No orders of the defined types were placed in this encounter.      Colorectal Screening:   N/A based on age and average risk  Last Completed Colonoscopy    This patient has no relevant Health Maintenance data.       CT for Smoker (Age 50-80, 20pk yr within last 15 years): N/A  Bone Density/DEXA (high risk): N/A  Hep C (Age 18-79 once): Pending  HIV (Age 15-65 once) : N/A  PSA (Over age 50, C Level Recommendation): N/A  US Aorta (For male smokers, age 65): N/A  A1c: No results found for: \"HGBA1C\" pending  Lipid panel: No results found for: \"LABLIPI\" previous mild elevation of LDL    The ASCVD Risk score (Lon TELLEZ, et al., 2019) failed to calculate for the following reasons:    The 2019 ASCVD risk score is only valid for ages 40 to 79    Dermatology: N/A  Ophthalmologist: N/A  Dentist: Regular checkups encouraged with appropriate hygiene    Tobacco Use: Low Risk  (3/25/2025)    Patient History     Smoking Tobacco Use: Never     Smokeless Tobacco Use: Never     Passive Exposure: Not on file       Social History     Substance and Sexual Activity   Alcohol Use Yes    Comment: moderately        Social History     Substance and Sexual Activity   Drug Use Never        Diet/Physical activity: Recently has become more diligent about a healthy diet and regular physical activity    Sexual health: No concern, contraception used    PHQ-2 Depression Screening  PHQ-9 Total Score:   0       Objective     Physical Exam:  Vital Signs:   Vitals:    03/25/25 1004   BP: 136/84   BP Location: Left arm   Patient Position: Sitting   Cuff Size: Adult   Pulse: (!) 133   Temp: 97.9 °F (36.6 °C)   TempSrc: Temporal   SpO2: 97%   Weight: 78.2 kg (172 lb 6.4 oz)   Height: 167.6 cm (66\")   PainSc: 4    PainLoc: Generalized     Facility age limit for growth %kelsea is 20 years.  Body mass index is 27.83 kg/m².     Physical Exam  Vitals and " nursing note reviewed.   Constitutional:       General: He is not in acute distress.     Appearance: Normal appearance. He is not ill-appearing, toxic-appearing or diaphoretic.      Comments: Healthy, NAD, alert and oriented, slightly anxious, BMI 27.8   HENT:      Head: Normocephalic and atraumatic.      Right Ear: Tympanic membrane, ear canal and external ear normal.      Left Ear: Tympanic membrane, ear canal and external ear normal.      Nose: Nose normal. No congestion or rhinorrhea.      Mouth/Throat:      Mouth: Mucous membranes are moist.      Pharynx: Oropharynx is clear.      Comments: Good dentition  Eyes:      Extraocular Movements: Extraocular movements intact.      Conjunctiva/sclera: Conjunctivae normal.      Pupils: Pupils are equal, round, and reactive to light.   Neck:      Vascular: No carotid bruit.      Comments: No periclavicular or axillary or inguinal adenopathy  Cardiovascular:      Rate and Rhythm: Normal rate and regular rhythm.      Pulses: Normal pulses.      Heart sounds: Normal heart sounds. No murmur heard.     No friction rub. No gallop.      Comments: 2+ carotids without bruits, 2+ radial pulses, 2+ femoral pulses without bruits, 2+ bipedal pulses with good perfusion and no dependent edema  Pulmonary:      Effort: Pulmonary effort is normal. No respiratory distress.      Breath sounds: Normal breath sounds. No stridor. No wheezing, rhonchi or rales.   Chest:      Chest wall: No tenderness.   Abdominal:      General: Bowel sounds are normal. There is no distension.      Palpations: Abdomen is soft. There is no mass.      Tenderness: There is no abdominal tenderness. There is no guarding or rebound.      Hernia: No hernia is present.   Genitourinary:     Comments: Normal circumcised male, testes descended bilaterally with no nodules or tenderness, no inguinal herniation or adenopathy  Musculoskeletal:         General: No swelling, tenderness, deformity or signs of injury. Normal range  of motion.      Cervical back: Normal range of motion and neck supple. No rigidity or tenderness.      Right lower leg: No edema.      Left lower leg: No edema.   Lymphadenopathy:      Cervical: No cervical adenopathy.   Skin:     General: Skin is warm and dry.      Capillary Refill: Capillary refill takes less than 2 seconds.      Findings: No lesion or rash.      Comments: Multiple tattoos   Neurological:      General: No focal deficit present.      Mental Status: He is alert and oriented to person, place, and time. Mental status is at baseline.      Cranial Nerves: No cranial nerve deficit.      Sensory: No sensory deficit.      Motor: No weakness.      Coordination: Coordination normal.      Gait: Gait normal.   Psychiatric:         Mood and Affect: Mood normal.         Thought Content: Thought content normal.         Judgment: Judgment normal.      Comments: Mildly anxious        Mood disorder questionnaire for bipolar disorder, PHQ-9 screening for depression and ANDREAS-7 screening for anxiety all significantly abnormal    Assessment / Plan      Assessment/Plan:   Diagnoses and all orders for this visit:    1. Encounter for general adult medical examination with abnormal findings (Primary)  Assessment & Plan:  34-year-old male presenting for complete physical with specific health issues being addressed as detailed below, health maintenance includes recent laboratory testing through the [facility] ER including CBC, CMP, troponin x 2, and EKG x 2 all normal.  Lipid profile mildly elevated with LDL of 142.  Plan supplement with TSH, vitamin D, hepatitis C and UA screens.  Patient being referred to psychiatry as well as for counseling to address his mental health issues.  Follow-up here in 1 year for another complete physical, and as needed in the interim.    Orders:  -     TSH Rfx On Abnormal To Free T4; Future  -     Vitamin D,25-Hydroxy; Future  -     Urinalysis With Culture If Indicated -;  Future  -     Hepatitis C Antibody; Future  -     Hepatitis C Antibody  -     Urinalysis With Culture If Indicated - Urine, Clean Catch  -     Vitamin D,25-Hydroxy  -     TSH Rfx On Abnormal To Free T4    2. Anxiety  Assessment & Plan:  Patient has had longstanding anxiety and depression with irritability and anger consistent with bipolar disorder with mixed features.  Previously had been prescribed sertraline past which she did not take, as well as briefly took Lexapro, and most recently had been prescribed Vraylar 1.5 mg nightly which she just took 1 dose yesterday.  His symptoms are becoming disabling regarding his quality of life.  Previously smoked marijuana but not for some time now.  Denies any history of illicit drug use or alcohol abuse otherwise.  No SI/HI.  Referring to psychiatry as well as for counseling, initial appointment today, deferring targeted medication regimen to that consultant.    Orders:  -     TSH Rfx On Abnormal To Free T4; Future  -     Ambulatory Referral to Behavioral Health  -     TSH Rfx On Abnormal To Free T4    3. Moderate episode of recurrent major depressive disorder  Assessment & Plan:  Patient has had longstanding anxiety and depression with irritability and anger consistent with bipolar disorder with mixed features.  Previously had been prescribed sertraline past which she did not take, as well as briefly took Lexapro, and most recently had been prescribed Vraylar 1.5 mg nightly which she just took 1 dose yesterday.  His symptoms are becoming disabling regarding his quality of life.  Previously smoked marijuana but not for some time now.  Denies any history of illicit drug use or alcohol abuse otherwise.  No SI/HI.  Referring to psychiatry as well as for counseling, initial appointment today, deferring targeted medication regimen to that consultant.    Orders:  -     Ambulatory Referral to Behavioral Health    4. Bipolar disorder, current episode mixed, moderate  Assessment &  Plan:  Patient has had longstanding anxiety and depression with irritability and anger consistent with bipolar disorder with mixed features.  Previously had been prescribed sertraline past which she did not take, as well as briefly took Lexapro, and most recently had been prescribed Vraylar 1.5 mg nightly which she just took 1 dose yesterday.  His symptoms are becoming disabling regarding his quality of life.  Previously smoked marijuana but not for some time now.  Denies any history of illicit drug use or alcohol abuse otherwise.  No SI/HI.  Referring to psychiatry as well as for counseling, initial appointment today, deferring targeted medication regimen to that consultant.    Orders:  -     Ambulatory Referral to Behavioral Health    5. Ex-cigarette smoker  Assessment & Plan:  Cigarette smoker approximately 0.5-1 pack/day, abstaining now for at least 3 to 4 years per patient account, previously indicated he had been abstaining since 2015.  Continue abstinence.      6. History of nicotine vaping  Assessment & Plan:  Patient had been vaping nicotine products since smoke abstinence several years ago.  He discontinued completely approximately 3 months ago      7. Overweight (BMI 25.0-29.9)  Assessment & Plan:  Patient encouraged to continue his recent efforts at healthy lifestyle changes with diet and regular physical activity      8. Dyslipidemia  Assessment & Plan:  Laboratory testing through [facility] ER on 12/21/2024 revealing a mildly elevated LDL of 142 with remainder of lipid profile satisfactory.  We discussed need to continue efforts at lifestyle change with diet and exercise, planning to repeat another lipid profile in 1 year      9. Vitamin D deficiency  Assessment & Plan:  Patient previously took fleeting supplement vitamin D, but no longer.  Update level and make further recommendation regarding need for supplementation.    Orders:  -     Vitamin D,25-Hydroxy; Future  -     Vitamin  D,25-Hydroxy    10. Need for hepatitis C screening test  -     Hepatitis C Antibody; Future  -     Hepatitis C Antibody    11. Screening for diabetes mellitus    12. Screening for thyroid disorder  -     TSH Rfx On Abnormal To Free T4; Future  -     TSH Rfx On Abnormal To Free T4    13. Parent refuses immunizations  Assessment & Plan:  Patient declines recommended Tdap, COVID-19 and influenza vaccines today despite counseling regarding safety and efficacy           Healthcare Maintenance:  Counseling provided based on age appropriate USPSTF guidelines.       Dakota Felix voices understanding and acceptance of this advice and will call back with any further questions or concerns. AVS with preventive healthcare tips printed for patient.     Vaccine Counseling:      Follow Up:   No follow-ups on file.    At [facility], we believe that sharing information builds trust and better relationships. You are receiving this note because you recently visited [facility]. It is possible you will see health information before a provider has talked with you about it. This kind of information can be easy to misunderstand. To help you fully understand what it means for your health, we urge you to discuss this note with your provider.    Mukul Melissa MD  [facility]

## 2025-03-26 LAB
25(OH)D3+25(OH)D2 SERPL-MCNC: 26.4 NG/ML (ref 30–100)
APPEARANCE UR: CLEAR
BACTERIA #/AREA URNS HPF: NORMAL /[HPF]
BILIRUB UR QL STRIP: NEGATIVE
CASTS URNS QL MICRO: NORMAL /LPF
COLOR UR: YELLOW
EPI CELLS #/AREA URNS HPF: NORMAL /HPF (ref 0–10)
GLUCOSE UR QL STRIP: NEGATIVE
HCV IGG SERPL QL IA: NON REACTIVE
HGB UR QL STRIP: NEGATIVE
KETONES UR QL STRIP: ABNORMAL
LEUKOCYTE ESTERASE UR QL STRIP: NEGATIVE
MICRO URNS: ABNORMAL
MICRO URNS: ABNORMAL
NITRITE UR QL STRIP: NEGATIVE
PH UR STRIP: 6 [PH] (ref 5–7.5)
PROT UR QL STRIP: ABNORMAL
RBC #/AREA URNS HPF: NORMAL /HPF (ref 0–2)
SP GR UR STRIP: 1.03 (ref 1–1.03)
TSH SERPL DL<=0.005 MIU/L-ACNC: 1.73 UIU/ML (ref 0.45–4.5)
URINALYSIS REFLEX: ABNORMAL
UROBILINOGEN UR STRIP-MCNC: 0.2 MG/DL (ref 0.2–1)
WBC #/AREA URNS HPF: NORMAL /HPF (ref 0–5)

## 2025-03-26 RX ORDER — CHOLECALCIFEROL (VITAMIN D3) 25 MCG
1000 TABLET ORAL DAILY
COMMUNITY

## 2025-03-27 ENCOUNTER — PATIENT ROUNDING (BHMG ONLY) (OUTPATIENT)
Dept: FAMILY MEDICINE CLINIC | Facility: CLINIC | Age: 35
End: 2025-03-27
Payer: COMMERCIAL

## 2025-03-27 NOTE — PROGRESS NOTES
.A [employer] message has been sent to the patient for patient rounding with [facility].

## 2025-03-28 ENCOUNTER — OFFICE VISIT (OUTPATIENT)
Age: 35
End: 2025-03-28
Payer: COMMERCIAL

## 2025-03-28 DIAGNOSIS — F41.1 GENERALIZED ANXIETY DISORDER WITH PANIC ATTACKS: Primary | ICD-10-CM

## 2025-03-28 DIAGNOSIS — F32.A ANXIETY AND DEPRESSION: ICD-10-CM

## 2025-03-28 DIAGNOSIS — F41.9 ANXIETY AND DEPRESSION: ICD-10-CM

## 2025-03-28 DIAGNOSIS — F41.0 GENERALIZED ANXIETY DISORDER WITH PANIC ATTACKS: Primary | ICD-10-CM
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
## 2025-04-08 ENCOUNTER — TELEMEDICINE (OUTPATIENT)
Age: 35
End: 2025-04-08
Payer: COMMERCIAL

## 2025-04-08 DIAGNOSIS — F41.0 GENERALIZED ANXIETY DISORDER WITH PANIC ATTACKS: Primary | ICD-10-CM

## 2025-04-08 DIAGNOSIS — F41.9 ANXIETY AND DEPRESSION: ICD-10-CM

## 2025-04-08 DIAGNOSIS — F32.A ANXIETY AND DEPRESSION: ICD-10-CM

## 2025-04-08 DIAGNOSIS — F41.1 GENERALIZED ANXIETY DISORDER WITH PANIC ATTACKS: Primary | ICD-10-CM

## 2025-04-08 NOTE — PROGRESS NOTES
Follow Up Note       Date Encounter: 2025   Name: Dakota Washington  MRN: [id number]  : 1990    Time In: 8:15 am  Time Out: 9:09 am     Referring Provider: Mukul Melissa MD    Chief Complaint: (F41.1,  F41.0) Generalized anxiety disorder with panic attacks    (F41.9,  F32.A) Anxiety and depression     History of Present Illness:   Dakota Washington is a 34 y.o. male who is being seen today for follow up counseling for Anxiety and Depression. Client agreed to a Telehealth appointment while present in his residence in [place]. Clinician was also present in her residence in [place].    Subjective      Patient's Support Network Includes:  parents    Medications:     Current Outpatient Medications:     Cholecalciferol 25 MCG (1000 UT) tablet, Take 1 tablet by mouth Daily., Disp: , Rfl:     fluticasone (FLONASE) 50 MCG/ACT nasal spray, Administer 2 sprays into the nostril(s) as directed by provider Daily., Disp: 16 g, Rfl: 1    lamoTRIgine (LaMICtal) 25 MG tablet, Take 1 tablet by mouth Daily for 14 days, THEN 2 tablets Daily for 16 days., Disp: 46 tablet, Rfl: 0    Allergies:   Allergies   Allergen Reactions    Penicillins Unknown - Low Severity        Objective    Mental Status Exam  Appearance:  clean and casually dressed, appropriate  Attitude toward clinician:  cooperative and agreeable   Speech:    Rate:  regular rate and rhythm   Volume:  normal  Motor:  no abnormal movements present  Mood:  Content  Affect:  mood congruent  Thought Processes:  linear, logical, and goal directed  Thought Content:  normal  Suicidal Thoughts:  absent  Homicidal Thoughts:  absent  Perceptual Disturbance: no perceptual disturbance  Attention and Concentration:  good  Insight and Judgement:  good  Memory:  memory appears to be intact      Assessment / Plan      Visit Diagnosis/Orders Placed This Visit:    ICD-10-CM ICD-9-CM   1. Generalized anxiety disorder with panic attacks  F41.1 300.02    F41.0 300.01   2. Anxiety and depression  F41.9  300.00    F32.A 311        PLAN:     Safety: No acute safety concerns.  Therapist will continue to monitor.   Assisted Patient in identifying risk factors which would indicate the need for higher level of care including thoughts to harm self or others and/or self-harming behavior and encouraged Patient to contact this office, text/call [phone], call 911, or present to the nearest emergency room should any of these events occur. Discussed crisis intervention services and means to access. Patient adamantly and convincingly denies current suicidal or homicidal ideation or perceptual disturbance.  Risk Assessment: Risk of self-harm acutely is low. Risk of self-harm chronically is also low, but could be further elevated in the event of treatment noncompliance and/or AODA.     Interventions:  Client was pleasant, engaged, and reflective. Clinician utilized OARS, active listening, empathy, provided support, and created a safe environment for client to process thoughts and feelings related to current stressors. Client reported feeling down, with little energy, little motivation, and little elaine in activities. Client reported he only feels briefly motivated to accomplish things if he knows he will be appreciated or if the other person shows excitement. Client acknowledged that he grew up with people pleasing tendencies because he didn't get his emotional needs met. He believes his emotional needs are still neglected. He is carrying that over with his wife and children. Clinician provided psycho education on Maslow's Hierarchy of Needs. Client understood the concept. He agreed that he may find it difficult to fill fulfilled in life unless he starts to prioritize his emotional needs.       Treatment Plan/Goals: Continue supportive psychotherapy efforts and medications as indicated. Treatment and medication options discussed during today's visit. Patient acknowledged and verbally consented to continue with current treatment plan and  was educated on the importance of compliance with treatment and follow-up appointments. Patient seems reasonably able to adhere to treatment plan.      Treatment Progress: Client demonstrated progress in today's session by recognizing the importance of prioritizing his emotional needs, not just meeting his physical needs.     Adherence to Treatment: Client is adherent to treatment, evidenced by attending his appointment and engaging in therapeutic conversation and interventions.     Follow Up:   Return in about 1 week (around 4/15/2025) for Psychotherapy, Virtual.    Tete Williamson, JARAD  April 8, 2025 09:25 EDT

## 2025-08-01 ENCOUNTER — TELEPHONE (OUTPATIENT)
Dept: FAMILY MEDICINE CLINIC | Facility: CLINIC | Age: 35
End: 2025-08-01

## 2025-08-01 ENCOUNTER — OFFICE VISIT (OUTPATIENT)
Dept: FAMILY MEDICINE CLINIC | Facility: CLINIC | Age: 35
End: 2025-08-01
Payer: COMMERCIAL

## 2025-08-01 VITALS
DIASTOLIC BLOOD PRESSURE: 82 MMHG | SYSTOLIC BLOOD PRESSURE: 126 MMHG | BODY MASS INDEX: 27.74 KG/M2 | HEIGHT: 66 IN | TEMPERATURE: 98.1 F | WEIGHT: 172.6 LBS | OXYGEN SATURATION: 98 % | HEART RATE: 76 BPM

## 2025-08-01 DIAGNOSIS — F41.0 GENERALIZED ANXIETY DISORDER WITH PANIC ATTACKS: ICD-10-CM

## 2025-08-01 DIAGNOSIS — F41.1 GENERALIZED ANXIETY DISORDER WITH PANIC ATTACKS: ICD-10-CM

## 2025-08-01 DIAGNOSIS — K52.9 GASTROENTERITIS: Primary | ICD-10-CM

## 2025-08-01 PROCEDURE — 99214 OFFICE O/P EST MOD 30 MIN: CPT | Performed by: INTERNAL MEDICINE

## 2025-08-01 RX ORDER — ONDANSETRON 4 MG/1
4 TABLET, ORALLY DISINTEGRATING ORAL EVERY 8 HOURS PRN
Qty: 12 TABLET | Refills: 0 | Status: SHIPPED | OUTPATIENT
Start: 2025-08-01

## 2025-08-01 RX ORDER — DICYCLOMINE HCL 20 MG
TABLET ORAL
Qty: 30 TABLET | Refills: 1 | Status: SHIPPED | OUTPATIENT
Start: 2025-08-01

## 2025-08-01 NOTE — TELEPHONE ENCOUNTER
.    Caller: Dakota Washington    Relationship: Self    Best call back number:   Telephone Information:   Mobile [phone]        What form or medical record are you requesting: WORK NOT FOR APPT ON 08/01/25    Who is requesting this form or medical record from you: GORDY PHAM    How would you like to receive the form or medical records (pick-up, mail, fax): [employer]  If fax, what is the fax number:   If mail, what is the address:   If pick-up, provide patient with address and location details    Timeframe paperwork needed: ASAP    Additional notes:

## 2025-08-01 NOTE — PROGRESS NOTES
Follow Up Office Visit      Date: 2025   Patient Name: Dakota Washington  : 1990   MRN: [id number]     Chief Complaint:    Chief Complaint   Patient presents with    stomach issues       History of Present Illness: Dakota Washington is a 34 y.o. male who is here for evaluation of abdominal pain.  He notes lately pursuing an intermittent fast with a 5-hour window of eating.  He liberalized this 3 days ago, splurging eating food at [employer], then later that same day getting food at a local fast food restaurant, including a salad.  After the latter meal he developed sudden onset and stomach cramps with some mild nausea but no vomiting associate with small amount of diarrheal stools and immediate decrease in appetite.  Symptoms did not improve through the next day, patient subsequently taking an old Ativan from a prescription from the ER in 2025.  Gradually seemed to get a little bit better over the next couple days but this morning ate a sausage egg and cheese biscuit, having subsequent increasing stomach cramping and small diarrhea symptoms since.  No fevers or chills, no headache sore throat or myalgias, no cough or cold symptoms and no GI symptoms.  Not aware of any ill contacts.  Relates concerned about a possible appendicitis or gallbladder problems, especially in context that he is going to be vacationing in [place] next week.    Subjective      Review of Systems:   Review of Systems    I have reviewed the patients family history, social history, past medical history, past surgical history and have updated it as appropriate.     Medications:     Current Outpatient Medications:     dicyclomine (BENTYL) 20 MG tablet, 1/2-1 tablet 3 times daily as needed for stomach cramps, Disp: 30 tablet, Rfl: 1    ondansetron ODT (ZOFRAN-ODT) 4 MG disintegrating tablet, Place 1 tablet on the tongue Every 8 (Eight) Hours As Needed for Vomiting or Nausea., Disp: 12 tablet, Rfl: 0    Allergies:   Allergies   Allergen  "Reactions    Penicillins Unknown - Low Severity       Objective     Physical Exam: Please see above  Vital Signs:   Vitals:    08/01/25 1528   BP: 126/82   BP Location: Left arm   Patient Position: Sitting   Cuff Size: Adult   Pulse: 76   Temp: 98.1 °F (36.7 °C)   TempSrc: Temporal   SpO2: 98%   Weight: 78.3 kg (172 lb 9.6 oz)   Height: 167.6 cm (66\")     Body mass index is 27.86 kg/m².          Physical Exam  Constitutional:       General: He is not in acute distress.     Appearance: Normal appearance. He is not ill-appearing.      Comments: Pleasant, healthy, NAD, hydrated, BMI 27.8 with stable weight over the last 4 months   Cardiovascular:      Rate and Rhythm: Normal rate.      Heart sounds: Normal heart sounds. No murmur heard.     No friction rub. No gallop.   Pulmonary:      Effort: Pulmonary effort is normal. No respiratory distress.      Breath sounds: Normal breath sounds.   Abdominal:      General: There is no distension.      Palpations: Abdomen is soft. There is no mass.      Tenderness: There is abdominal tenderness. There is no right CVA tenderness, left CVA tenderness, guarding or rebound.      Hernia: No hernia is present.      Comments: Mildly hyperactive bowel sounds with discomfort most prominently noted in the epigastrium, no rebound or guard, no organomegaly or masses, normal bowel sounds   Musculoskeletal:      Cervical back: No rigidity or tenderness.   Lymphadenopathy:      Cervical: No cervical adenopathy.   Skin:     General: Skin is dry.      Findings: No rash.   Neurological:      General: No focal deficit present.      Mental Status: He is alert.   Psychiatric:         Mood and Affect: Mood normal.         Procedures    Results:   Labs:   TSH   Date Value Ref Range Status   03/25/2025 1.730 0.450 - 4.500 uIU/mL Final        POCT Results (if applicable):   Results for orders placed or performed in visit on 03/25/25   Hepatitis C Antibody    Collection Time: 03/25/25 11:15 AM    " Specimen: Arm, Right; Blood    Blood  Release to camryn   Result Value Ref Range    Hep C Virus Ab Non Reactive Non Reactive   Urinalysis With Culture If Indicated - Urine, Clean Catch    Collection Time: 03/25/25 11:15 AM    Specimen: Urine, Clean Catch    Urine  Release to camryn   Result Value Ref Range    Specific Gravity, UA 1.026 1.005 - 1.030    pH, UA 6.0 5.0 - 7.5    Color, UA Yellow Yellow    Appearance, UA Clear Clear    Leukocytes, UA Negative Negative    Protein Trace Negative/Trace    Glucose, UA Negative Negative    Ketones Trace (A) Negative    Blood, UA Negative Negative    Bilirubin, UA Negative Negative    Urobilinogen, UA 0.2 0.2 - 1.0 mg/dL    Nitrite, UA Negative Negative    Microscopic Examination Comment     Microscopic Examination See below:     Urinalysis Reflex Comment    Vitamin D,25-Hydroxy    Collection Time: 03/25/25 11:15 AM    Specimen: Arm, Right; Blood    Blood  Release to camryn   Result Value Ref Range    25 Hydroxy, Vitamin D 26.4 (L) 30.0 - 100.0 ng/mL   TSH Rfx On Abnormal To Free T4    Collection Time: 03/25/25 11:15 AM    Specimen: Arm, Right; Blood    Blood  Release to camryn   Result Value Ref Range    TSH 1.730 0.450 - 4.500 uIU/mL   Microscopic Examination -    Collection Time: 03/25/25 11:15 AM    Urine  Release to camryn   Result Value Ref Range    WBC, UA 0-5 0 - 5 /hpf    RBC, UA None seen 0 - 2 /hpf    Epithelial Cells (non renal) None seen 0 - 10 /hpf    Casts None seen None seen /lpf    Bacteria, UA None seen None seen/Few       Assessment / Plan      Assessment/Plan:   Diagnoses and all orders for this visit:    1. Gastroenteritis (Primary)  Assessment & Plan:  3 days history of sudden onset of abdominal cramping with some nausea and diarrheal stools.  No fevers chills headache sore throat respiratory or  complaints.  Seem to wax and wane, Opal a bit better this morning but then consuming a sausage egg and cheese biscuit causing increased symptoms again.  Clinically  does not have any evidence of an acute abdomen, differential most likely representing a nonspecific gastroenteritis, less likely IBS symptoms, though patient does have a history of anxiety.  Does not appear acutely ill at all.  Plan clear liquids advance as tolerated, Bentyl as needed for stomach cramps or severe diarrhea, and rest.  Advised if symptoms not improving over the next several days or for any acute worsening of symptoms in the interim    Orders:  -     dicyclomine (BENTYL) 20 MG tablet; 1/2-1 tablet 3 times daily as needed for stomach cramps  Dispense: 30 tablet; Refill: 1  -     ondansetron ODT (ZOFRAN-ODT) 4 MG disintegrating tablet; Place 1 tablet on the tongue Every 8 (Eight) Hours As Needed for Vomiting or Nausea.  Dispense: 12 tablet; Refill: 0    2. Generalized anxiety disorder with panic attacks  Assessment & Plan:  Patient does have a history of anxiety disorder with panic attacks, previously having been medicated most recently with Lamictal which he is no longer taking, in general indicating he is doing relatively well from the standpoint.  He does have an old prescription of Ativan 1 mg tablets, #20 prescribed by ER in 12/2024, which he has hardly ever utilized.  Observation recommended at this time          Vaccine Counseling:      Follow Up:   Return if symptoms worsen or fail to improve.      At [facility], we believe that sharing information builds trust and better relationships. You are receiving this note because you recently visited [facility]. It is possible you will see health information before a provider has talked with you about it. This kind of information can be easy to misunderstand. To help you fully understand what it means for your health, we urge you to discuss this note with your provider.    Mukul Melissa MD  [facility]

## 2025-08-01 NOTE — ASSESSMENT & PLAN NOTE
Patient does have a history of anxiety disorder with panic attacks, previously having been medicated most recently with Lamictal which he is no longer taking, in general indicating he is doing relatively well from the standpoint.  He does have an old prescription of Ativan 1 mg tablets, #20 prescribed by ER in 12/2024, which he has hardly ever utilized.  Observation recommended at this time

## 2025-08-01 NOTE — ASSESSMENT & PLAN NOTE
3 days history of sudden onset of abdominal cramping with some nausea and diarrheal stools.  No fevers chills headache sore throat respiratory or  complaints.  Seem to wax and wane, Opal a bit better this morning but then consuming a sausage egg and cheese biscuit causing increased symptoms again.  Clinically does not have any evidence of an acute abdomen, differential most likely representing a nonspecific gastroenteritis, less likely IBS symptoms, though patient does have a history of anxiety.  Does not appear acutely ill at all.  Plan clear liquids advance as tolerated, Bentyl as needed for stomach cramps or severe diarrhea, and rest.  Advised if symptoms not improving over the next several days or for any acute worsening of symptoms in the interim